# Patient Record
Sex: MALE | Race: BLACK OR AFRICAN AMERICAN | Employment: OTHER
[De-identification: names, ages, dates, MRNs, and addresses within clinical notes are randomized per-mention and may not be internally consistent; named-entity substitution may affect disease eponyms.]

---

## 2022-03-18 PROBLEM — I10 ESSENTIAL HYPERTENSION: Status: ACTIVE | Noted: 2018-10-19

## 2022-03-18 PROBLEM — T81.49XA ABDOMINAL WALL ABSCESS AT SITE OF SURGICAL WOUND: Status: ACTIVE | Noted: 2020-04-23

## 2022-03-18 PROBLEM — A49.8 ENTEROCOCCUS FAECALIS INFECTION: Status: ACTIVE | Noted: 2020-04-23

## 2022-03-18 PROBLEM — R60.9 EDEMA: Status: ACTIVE | Noted: 2018-07-07

## 2022-03-18 PROBLEM — T81.49XA SURGICAL SITE INFECTION: Status: ACTIVE | Noted: 2020-04-23

## 2022-03-18 PROBLEM — D84.821 IMMUNOCOMPROMISED STATE DUE TO DRUG THERAPY (HCC): Status: ACTIVE | Noted: 2020-04-23

## 2022-03-18 PROBLEM — B95.2 ENTEROCOCCUS FAECALIS INFECTION: Status: ACTIVE | Noted: 2020-04-23

## 2022-03-18 PROBLEM — N18.6 END STAGE RENAL DISEASE ON DIALYSIS (HCC): Status: ACTIVE | Noted: 2018-02-07

## 2022-03-18 PROBLEM — B19.20 VIRAL HEPATITIS C: Status: ACTIVE | Noted: 2018-10-05

## 2022-03-18 PROBLEM — N28.89 RENAL MASS, RIGHT: Status: ACTIVE | Noted: 2019-11-10

## 2022-03-18 PROBLEM — Z79.899 IMMUNOCOMPROMISED STATE DUE TO DRUG THERAPY (HCC): Status: ACTIVE | Noted: 2020-04-23

## 2022-03-18 PROBLEM — D84.9 IMMUNOCOMPROMISED PATIENT (HCC): Status: ACTIVE | Noted: 2020-04-23

## 2022-03-18 PROBLEM — Z94.0 DECEASED-DONOR KIDNEY TRANSPLANT: Status: ACTIVE | Noted: 2018-10-05

## 2022-03-18 PROBLEM — Z99.2 END STAGE RENAL DISEASE ON DIALYSIS (HCC): Status: ACTIVE | Noted: 2018-02-07

## 2022-03-19 PROBLEM — E83.42 HYPOMAGNESEMIA: Status: ACTIVE | Noted: 2018-07-07

## 2022-03-19 PROBLEM — N18.5 CKD (CHRONIC KIDNEY DISEASE) STAGE 5, GFR LESS THAN 15 ML/MIN (HCC): Status: ACTIVE | Noted: 2018-07-07

## 2022-03-19 PROBLEM — N52.9 ED (ERECTILE DYSFUNCTION): Status: ACTIVE | Noted: 2017-08-24

## 2022-03-19 PROBLEM — C64.2 RENAL CANCER, LEFT (HCC): Status: ACTIVE | Noted: 2018-09-28

## 2022-03-19 PROBLEM — A49.8 KLEBSIELLA PNEUMONIAE INFECTION: Status: ACTIVE | Noted: 2020-04-23

## 2022-03-19 PROBLEM — N25.81 SECONDARY HYPERPARATHYROIDISM OF RENAL ORIGIN (HCC): Status: ACTIVE | Noted: 2018-07-07

## 2022-03-19 PROBLEM — E55.9 VITAMIN D DEFICIENCY: Status: ACTIVE | Noted: 2018-07-07

## 2022-03-19 PROBLEM — K51.00 PANCOLITIS (HCC): Status: ACTIVE | Noted: 2018-10-05

## 2022-03-19 PROBLEM — I25.10 ATHEROSCLEROSIS OF CORONARY ARTERY: Status: ACTIVE | Noted: 2019-02-26

## 2022-03-19 PROBLEM — E78.00 HYPERCHOLESTEREMIA: Status: ACTIVE | Noted: 2018-07-07

## 2022-03-19 PROBLEM — I10 HYPERTENSION: Status: ACTIVE | Noted: 2018-07-07

## 2022-03-20 PROBLEM — E11.9 DIABETES (HCC): Status: ACTIVE | Noted: 2018-07-07

## 2022-03-20 PROBLEM — E87.20 METABOLIC ACIDOSIS: Status: ACTIVE | Noted: 2018-07-07

## 2022-03-20 PROBLEM — L08.9 SOFT TISSUE INFECTION: Status: ACTIVE | Noted: 2020-04-23

## 2022-03-20 PROBLEM — R19.7 DIARRHEA: Status: ACTIVE | Noted: 2018-10-06

## 2022-11-03 PROBLEM — C64.1 CLEAR CELL RENAL CELL CARCINOMA, RIGHT (HCC): Status: ACTIVE | Noted: 2021-10-12

## 2023-03-06 ENCOUNTER — HOSPITAL ENCOUNTER (OUTPATIENT)
Facility: HOSPITAL | Age: 68
Discharge: HOME | End: 2023-03-09
Payer: MEDICARE

## 2023-03-06 DIAGNOSIS — C64.1 MALIGNANT NEOPLASM OF RIGHT KIDNEY, EXCEPT RENAL PELVIS (HCC): ICD-10-CM

## 2023-03-06 DIAGNOSIS — C64.2 MALIGNANT NEOPLASM OF LEFT KIDNEY, EXCEPT RENAL PELVIS (HCC): ICD-10-CM

## 2023-03-06 PROCEDURE — 74176 CT ABD & PELVIS W/O CONTRAST: CPT

## 2023-03-07 ENCOUNTER — NURSE ONLY (OUTPATIENT)
Age: 68
End: 2023-03-07

## 2023-03-07 VITALS
HEIGHT: 70 IN | WEIGHT: 243 LBS | BODY MASS INDEX: 34.79 KG/M2 | TEMPERATURE: 97.9 F | OXYGEN SATURATION: 98 % | RESPIRATION RATE: 18 BRPM | HEART RATE: 71 BPM

## 2023-03-07 DIAGNOSIS — B19.20 HEPATITIS C VIRUS INFECTION WITHOUT HEPATIC COMA, UNSPECIFIED CHRONICITY: ICD-10-CM

## 2023-03-07 DIAGNOSIS — E11.22 TYPE 2 DIABETES MELLITUS WITH DIABETIC CHRONIC KIDNEY DISEASE, UNSPECIFIED CKD STAGE, UNSPECIFIED WHETHER LONG TERM INSULIN USE (HCC): ICD-10-CM

## 2023-03-07 DIAGNOSIS — Z94.0 KIDNEY TRANSPLANT STATUS: ICD-10-CM

## 2023-03-07 DIAGNOSIS — Z01.818 PRE-OP TESTING: Primary | ICD-10-CM

## 2023-03-07 DIAGNOSIS — E11.9 DIABETES MELLITUS TYPE 2 WITHOUT RETINOPATHY (HCC): ICD-10-CM

## 2023-03-07 NOTE — PROGRESS NOTES
Colon Screen    Patient: Marlen Vang MRN: [id number]  SSN: [id number]    YOB: 1955  Age: 75 y.o. Sex: male        Subjective:   Marlen Vang was referred by his PCP, Maximo Russell MD.  Patient referred for colonoscopy for   Screening colonoscopy. Patient denies rectal pain or bleeding. Abdominal surgeries as described below, specifically left nephrectomy and kidney transplant. Family history as described below, specifically none. Last colonoscopy was 5 years ago. No Known Allergies    Past Medical History:   Diagnosis Date    Anuria     Carcinoma, renal cell, left ([facility] [address] 2018    CKD (chronic kidney disease)     Diabetes mellitus ([facility] [address]     Edema     Hypercholesteremia     Hypertension      Past Surgical History:   Procedure Laterality Date    COLONOSCOPY  2018    KIDNEY TRANSPLANT  2019     donor renal transplant - left kidney to the right iliac fossaUreteroneocystostomy over 4.7F 14cm double J ureteral stent    NEPHRECTOMY Left 2018    Dr. Viri Dallas    OTHER SURGICAL HISTORY      Teeth      Family History   Problem Relation Age of Onset    Diabetes Brother     Emphysema Father     High Cholesterol Mother     Heart Attack Mother      Social History     Tobacco Use    Smoking status: Former    Smokeless tobacco: Never   Substance Use Topics    Alcohol use: No      Prior to Admission medications    Medication Sig Start Date End Date Taking?  Authorizing Provider   Lancets MISC One Touch Delica Lancets, E 51.5 Type 2 Diabetes, Yes Z279.4 two times daily 19  Yes Ar Automatic Reconciliation   allopurinol (ZYLOPRIM) 100 MG tablet Take 100 mg by mouth 19  Yes Ar Automatic Reconciliation   aspirin 81 MG chewable tablet Take 81 mg by mouth 9/3/19  Yes Ar Automatic Reconciliation   carvedilol (COREG) 25 MG tablet Take 25 mg by mouth 19  Yes Ar Automatic Reconciliation   chlorthalidone (HYGROTON) 25 MG tablet Take by mouth daily Yes Ar Automatic Reconciliation   cloNIDine (CATAPRES) 0.1 MG tablet Take by mouth 2 times daily   Yes Ar Automatic Reconciliation   empagliflozin (JARDIANCE) 25 MG tablet Take 25 mg by mouth daily 10/21/22  Yes Ar Automatic Reconciliation   famotidine (PEPCID) 20 MG tablet Take 20 mg by mouth 12/13/19  Yes Ar Automatic Reconciliation   furosemide (LASIX) 40 MG tablet ceived the following from [place] [address] [facility]: Outside name: furosemide (LASIX) 40 mg tablet 9/26/22  Yes Ar Automatic Reconciliation   hydrALAZINE (APRESOLINE) 100 MG tablet ceived the following from [facility]: Outside name: hydrALAZINE (APRESOLINE) 100 mg tablet 9/22/22  Yes Ar Automatic Reconciliation   insulin lispro (HUMALOG) 100 UNIT/ML SOLN injection vial INJECT 8 UNITS BENEATH THE SKIN 3 (THREE) TIMES DAILY BEFORE MEALS. E11.9 11/22/20  Yes Ar Automatic Reconciliation   insulin NPH (HUMULIN N;NOVOLIN N) 100 UNIT/ML injection vial 10-20 Units   Yes Ar Automatic Reconciliation   Mycophenolate Sodium 360 MG TBEC TAKE 1 TAB BY MOUTH TWICE DAILY.  Z94.0 2/5/21  Yes Ar Automatic Reconciliation   NIFEdipine (PROCARDIA XL) 60 MG extended release tablet Take 60 mg by mouth 2 times daily 10/21/22  Yes Ar Automatic Reconciliation   nitroGLYCERIN (NITROSTAT) 0.4 MG SL tablet Place 0.4 mg under the tongue 2/27/19  Yes Ar Automatic Reconciliation   pravastatin (PRAVACHOL) 20 MG tablet ceived the following from [facility].  - [facility]: Outside name: pravastatin (PRAVACHOL) 20 mg tablet 10/5/22  Yes Ar Automatic Reconciliation   predniSONE (DELTASONE) 20 MG tablet ceived the following from [place] [address] - [facility]: Outside name: predniSONE (DELTASONE) 20 mg tablet 11/7/19  Yes Ar Automatic Reconciliation   sodium bicarbonate 650 MG tablet Take 1,300 mg by mouth 12/19/19  Yes Ar Automatic Reconciliation   tacrolimus (PROGRAF) 1 MG capsule Take 1-2 mg by mouth 12/18/19  Yes Ar Automatic Reconciliation   tamsulosin (FLOMAX) 0.4 MG capsule 0.4 mg 7/24/17  Yes Ar Automatic Reconciliation          Risks colonoscopy described- colon injury, missed lesion, anesthesia problems, bleeding       Chris Álvarez LPN  March 7, 7359  34:83 PM

## 2023-04-19 PROBLEM — B19.20 VIRAL HEPATITIS C: Status: ACTIVE | Noted: 2018-10-05

## 2023-04-19 PROBLEM — E66.01 SEVERE OBESITY WITH BODY MASS INDEX (BMI) OF 35.0 TO 39.9 WITH SERIOUS COMORBIDITY (HCC): Status: ACTIVE | Noted: 2018-07-07

## 2023-04-19 PROBLEM — Z99.2 END STAGE RENAL DISEASE ON DIALYSIS (HCC): Status: ACTIVE | Noted: 2018-02-07

## 2023-04-19 PROBLEM — N28.89 LEFT RENAL MASS: Status: ACTIVE | Noted: 2018-08-16

## 2023-04-19 PROBLEM — I25.10 CORONARY ATHEROSCLEROSIS: Status: ACTIVE | Noted: 2019-02-26

## 2023-04-19 PROBLEM — N18.6 END STAGE RENAL DISEASE ON DIALYSIS (HCC): Status: ACTIVE | Noted: 2018-02-07

## 2023-04-19 PROBLEM — Z90.5 H/O UNILATERAL NEPHRECTOMY: Status: ACTIVE | Noted: 2018-10-05

## 2023-04-19 PROBLEM — E11.9 DIABETES (HCC): Status: ACTIVE | Noted: 2018-07-07

## 2023-07-19 PROBLEM — C64.9 RENAL CELL CARCINOMA (HCC): Status: ACTIVE | Noted: 2018-09-28

## 2024-01-11 ENCOUNTER — OFFICE VISIT (OUTPATIENT)
Age: 69
End: 2024-01-11
Payer: MEDICARE

## 2024-01-11 VITALS — WEIGHT: 240.4 LBS | HEIGHT: 70 IN | BODY MASS INDEX: 34.41 KG/M2

## 2024-01-11 DIAGNOSIS — M48.062 SPINAL STENOSIS OF LUMBAR REGION WITH NEUROGENIC CLAUDICATION: Primary | ICD-10-CM

## 2024-01-11 DIAGNOSIS — M54.42 CHRONIC BILATERAL LOW BACK PAIN WITH BILATERAL SCIATICA: ICD-10-CM

## 2024-01-11 DIAGNOSIS — M54.41 CHRONIC BILATERAL LOW BACK PAIN WITH BILATERAL SCIATICA: ICD-10-CM

## 2024-01-11 DIAGNOSIS — G89.29 CHRONIC BILATERAL LOW BACK PAIN WITH BILATERAL SCIATICA: ICD-10-CM

## 2024-01-11 PROCEDURE — 99203 OFFICE O/P NEW LOW 30 MIN: CPT | Performed by: PHYSICAL MEDICINE & REHABILITATION

## 2024-01-11 PROCEDURE — 1123F ACP DISCUSS/DSCN MKR DOCD: CPT | Performed by: PHYSICAL MEDICINE & REHABILITATION

## 2024-01-11 ASSESSMENT — PATIENT HEALTH QUESTIONNAIRE - PHQ9
SUM OF ALL RESPONSES TO PHQ QUESTIONS 1-9: 0
SUM OF ALL RESPONSES TO PHQ QUESTIONS 1-9: 0
SUM OF ALL RESPONSES TO PHQ9 QUESTIONS 1 & 2: 0
SUM OF ALL RESPONSES TO PHQ QUESTIONS 1-9: 0
1. LITTLE INTEREST OR PLEASURE IN DOING THINGS: 0
SUM OF ALL RESPONSES TO PHQ QUESTIONS 1-9: 0
2. FEELING DOWN, DEPRESSED OR HOPELESS: 0

## 2024-01-11 NOTE — PROGRESS NOTES
[facility]  [facility] [facility] [facility], [address]  [place] [address]  Phone: [phone]  Fax: [phone]      Patient: Ruel Parekh                                                                              MRN: [id number]        YOB: 1955          AGE: 68 y.o.             PCP: Sandra Gilmore MD  Date:  01/11/24    Reason for Consultation: Back Pain      HPI:  Ruel Parekh is a 68 y.o. male with relevant PMH of  CKD has been on dialysis- right kidney transplant 2018, and then had left renal cancer s/p nephrectomy, DM,   who presents with low back pain radiating down bilateral legs which began around 10/2023 when he noticed numbness tingling in bilateral legs. Denies any precipitating incident or trauma.       Neurologic symptoms: + numbness, tingling, weakness, bowel or bladder changes.  No recent falls  but had a close call 10/2023    Location: The pain is located in the low back pain   Radiation: The pain does radiate down bilateral legs .    Pain Score: Currently: 3/10   Quality: Pain is of a aching, numbness, tinglingquality.    Aggravating: Pain is exacerbated by walking and standing  Alleviating: The pain is alleviated by sitting    Prior Treatments:  Physical therapy: No  Injections:No  Surgery:No  Previous Medications:   Current Medications: tylenol   Previous work-up has included:   X-ray lumbar spine 11/9/2023  There is a very mild curvature of the lumbar spine with convexity to the right centered at the L3 level. There is mild disc space narrowing at the L4-L5 and L5-S1 levels. Extensive discal spurring is seen at the L5-S1 level. Severe lower lumbar facet arthropathy is present at the L3-L4, L4-L5 and L5-S1 levels. Minimal 2 to 3 mm anterolisthesis is present at the L2-L3 level. The pedicles are normal.   Past Medical History:   Past Medical History:   Diagnosis Date    Anuria     Carcinoma, renal cell, left (HCC) 09/21/2018    CKD

## 2024-01-11 NOTE — PROGRESS NOTES
Ruel Parekh presents today for   Chief Complaint   Patient presents with    Back Pain       Is someone accompanying this pt? no    Is the patient using any DME equipment during OV? no    Depression Screening:       No data to display                Learning Assessment:      Abuse Screening:       No data to display                Fall Risk      OPIOID RISK TOOL      Coordination of Care:  1. Have you been to the ER, urgent care clinic since your last visit? no  Hospitalized since your last visit? no    2. Have you seen or consulted any other health care providers outside of the [facility] since your last visit? no Include any pap smears or colon screening. no

## 2024-03-29 DIAGNOSIS — N18.5 CKD (CHRONIC KIDNEY DISEASE) STAGE 5, GFR LESS THAN 15 ML/MIN (HCC): ICD-10-CM

## 2024-03-29 DIAGNOSIS — E11.22 TYPE 2 DIABETES MELLITUS WITH DIABETIC CHRONIC KIDNEY DISEASE, UNSPECIFIED CKD STAGE, UNSPECIFIED WHETHER LONG TERM INSULIN USE (HCC): ICD-10-CM

## 2024-03-29 DIAGNOSIS — Z99.2 END STAGE RENAL DISEASE ON DIALYSIS (HCC): ICD-10-CM

## 2024-03-29 DIAGNOSIS — Z01.818 PRE-OP TESTING: Primary | ICD-10-CM

## 2024-03-29 DIAGNOSIS — N18.6 END STAGE RENAL DISEASE ON DIALYSIS (HCC): ICD-10-CM

## 2024-03-29 DIAGNOSIS — I10 ESSENTIAL HYPERTENSION: ICD-10-CM

## 2024-04-17 ENCOUNTER — TELEPHONE (OUTPATIENT)
Age: 69
End: 2024-04-17

## 2024-04-17 NOTE — TELEPHONE ENCOUNTER
LMOM for pt to return call. Pt needs to complete labs and EKG. Pt is scheduled for colonoscopy on 04/26/24 with an arrival time of 12:45 PM. LM that a return call is needed by this Friday, 04/19/24 to confirm.

## 2024-04-23 NOTE — TELEPHONE ENCOUNTER
LMOM for pt to call back to reschedule the colonoscopy since the blood work and EKG have not been completed.     Colonoscopy is postponed at this time.

## 2025-01-10 ENCOUNTER — OFFICE VISIT (OUTPATIENT)
Age: 70
End: 2025-01-10
Payer: MEDICARE

## 2025-01-10 VITALS
BODY MASS INDEX: 33.36 KG/M2 | WEIGHT: 233 LBS | OXYGEN SATURATION: 96 % | HEART RATE: 76 BPM | HEIGHT: 70 IN | SYSTOLIC BLOOD PRESSURE: 124 MMHG | DIASTOLIC BLOOD PRESSURE: 70 MMHG

## 2025-01-10 DIAGNOSIS — I25.10 CORONARY ARTERY DISEASE INVOLVING NATIVE CORONARY ARTERY OF NATIVE HEART WITHOUT ANGINA PECTORIS: Primary | ICD-10-CM

## 2025-01-10 PROCEDURE — 3078F DIAST BP <80 MM HG: CPT | Performed by: INTERNAL MEDICINE

## 2025-01-10 PROCEDURE — 99204 OFFICE O/P NEW MOD 45 MIN: CPT | Performed by: INTERNAL MEDICINE

## 2025-01-10 PROCEDURE — 3074F SYST BP LT 130 MM HG: CPT | Performed by: INTERNAL MEDICINE

## 2025-01-10 PROCEDURE — 1123F ACP DISCUSS/DSCN MKR DOCD: CPT | Performed by: INTERNAL MEDICINE

## 2025-01-10 ASSESSMENT — PATIENT HEALTH QUESTIONNAIRE - PHQ9
SUM OF ALL RESPONSES TO PHQ9 QUESTIONS 1 & 2: 0
1. LITTLE INTEREST OR PLEASURE IN DOING THINGS: NOT AT ALL
SUM OF ALL RESPONSES TO PHQ QUESTIONS 1-9: 0
2. FEELING DOWN, DEPRESSED OR HOPELESS: NOT AT ALL
SUM OF ALL RESPONSES TO PHQ QUESTIONS 1-9: 0

## 2025-01-10 NOTE — PROGRESS NOTES
Identified pt with two pt identifiers(name and ). Reviewed record in preparation for visit and have obtained necessary documentation.    Ruel Parekh presents today for   Chief Complaint   Patient presents with    New Patient      Est Care         Pt c/o SOB, FATIGUE/WEAKNESS,             Ruel Parekh preferred language for health care discussion is english/other.    Personal Protective Equipment:   Personal Protective Equipment was used including: mask-surgical and hands-gloves. Patient was placed on no precaution(s). Patient was not masked.    Precautions:   Patient currently on None  Patient currently roomed with door closed.    Is someone accompanying this pt? no    Is the patient using any DME equipment during OV? cane    Depression Screenin/10/2025     9:20 AM 2024     2:13 PM   PHQ-9 Questionaire   Little interest or pleasure in doing things 0 0   Feeling down, depressed, or hopeless 0 0   PHQ-9 Total Score 0 0        Learning Assessment:  Who is the primary learner? Patient    What is the preferred language for health care of the primary learner? ENGLISH    How does the primary learner prefer to learn new concepts? DEMONSTRATION    Answered By patient    Relationship to Learner SELF        Abuse Screenin/10/2025     9:00 AM   AMB Abuse Screening   Do you ever feel afraid of your partner? N   Are you in a relationship with someone who physically or mentally threatens you? N   Is it safe for you to go home? Y          Fall Risk      1/10/2025     9:18 AM 2024     2:13 PM   Fall Risk   Do you feel unsteady or are you worried about falling?  no yes   2 or more falls in past year? no no   Fall with injury in past year? no no         Pt currently taking Anticoagulant /Antiplatelet therapy? aspirin    Coordination of Care:  1. Have you been to the ER, urgent care clinic since your last visit? Hospitalized since your last visit? no    2. Have you seen or consulted any other

## 2025-01-10 NOTE — PATIENT INSTRUCTIONS
Testing   Nuclear Stress    Nuclear Stress Instructions-  PATIENT PREPS:   -NPO (Nothing to eat or drink) after midnight the night prior to the exam.    -No medications on the day of exam. You may bring them with you.   -Wear comfortable clothing and shoes suitable for walking on a treadmill. (NO sandals, flip flops, high heels, etc)   -The duration of this exam is approximately 2 to 4 hours.

## 2025-01-21 NOTE — PROGRESS NOTES
[facility] -  Office Consult Note      Primary Care Physician:  Lila Decker ACNP         Assessment:     Shortness of breath/PARTIDA (change in exercise tolerance)  CAD status post coronary stenting 02/2019 mid LAD using a 2.5 x 28 mm Synergy CHRISTOPHER and successful PCI of the distal left circumflex using a 3.0 x 16 mm Synergy CHRISTOPHER.  Nuclear stress test completed 7/13/2021, no significant evidence of ischemia or infarct.  ESRD, status post DDKT 11/6/2019.  Hypercholesterolemia  Diabetes, type II  Remote tobacco  Hypertension, essential  History of unilateral nephrectomy (left, 2/2 renal cell cancer)  ED      Patient Active Problem List   Diagnosis    Surgical site infection    Viral hepatitis C    Essential hypertension    Enterococcus faecalis infection    Abdominal wall abscess at site of surgical wound    H/O unilateral nephrectomy    End stage renal disease on dialysis (HCC)    Immunocompromised patient (HCC)    Immunocompromised state due to drug therapy (HCC)    Left renal mass    Edema    Hypomagnesemia    Hypercholesteremia    Hypertension    CKD (chronic kidney disease) stage 5, GFR less than 15 ml/min (HCC)    Klebsiella pneumoniae infection    Therapeutic drug monitoring    Pancolitis (HCC)    Secondary hyperparathyroidism of renal origin (HCC)    Renal cell carcinoma (HCC)    Coronary atherosclerosis    ED (erectile dysfunction)    Vitamin D deficiency    Diarrhea    Diabetes (HCC)    Metabolic acidosis    Soft tissue infection    Clear cell renal cell carcinoma, right (HCC)    Severe obesity with body mass index (BMI) of 35.0 to 39.9 with serious comorbidity    Type II or unspecified type diabetes mellitus with peripheral circulatory disorders, not stated as uncontrolled(250.70)        Plan:     Nuclear stress test.  Return after test completed.     History of Present Illness:     This is a 69 y.o. man with the above mentioned previous medical/surgical illnesses that is requesting cardiology

## 2025-04-07 ENCOUNTER — OFFICE VISIT (OUTPATIENT)
Age: 70
End: 2025-04-07
Payer: MEDICARE

## 2025-04-07 VITALS
SYSTOLIC BLOOD PRESSURE: 113 MMHG | BODY MASS INDEX: 32.64 KG/M2 | WEIGHT: 228 LBS | DIASTOLIC BLOOD PRESSURE: 64 MMHG | HEART RATE: 72 BPM | OXYGEN SATURATION: 97 % | HEIGHT: 70 IN

## 2025-04-07 DIAGNOSIS — I25.10 CORONARY ARTERY DISEASE INVOLVING NATIVE CORONARY ARTERY OF NATIVE HEART WITHOUT ANGINA PECTORIS: Primary | ICD-10-CM

## 2025-04-07 PROCEDURE — 1123F ACP DISCUSS/DSCN MKR DOCD: CPT | Performed by: INTERNAL MEDICINE

## 2025-04-07 PROCEDURE — 3074F SYST BP LT 130 MM HG: CPT | Performed by: INTERNAL MEDICINE

## 2025-04-07 PROCEDURE — 3078F DIAST BP <80 MM HG: CPT | Performed by: INTERNAL MEDICINE

## 2025-04-07 PROCEDURE — 99214 OFFICE O/P EST MOD 30 MIN: CPT | Performed by: INTERNAL MEDICINE

## 2025-04-07 RX ORDER — ERGOCALCIFEROL 1.25 MG/1
CAPSULE, LIQUID FILLED ORAL
COMMUNITY
Start: 2025-01-20

## 2025-04-07 RX ORDER — BLOOD SUGAR DIAGNOSTIC
STRIP MISCELLANEOUS
COMMUNITY
Start: 2025-02-07

## 2025-04-07 RX ORDER — CLONIDINE 0.3 MG/24H
PATCH, EXTENDED RELEASE TRANSDERMAL
COMMUNITY
Start: 2025-04-04

## 2025-04-07 ASSESSMENT — PATIENT HEALTH QUESTIONNAIRE - PHQ9
SUM OF ALL RESPONSES TO PHQ QUESTIONS 1-9: 0
1. LITTLE INTEREST OR PLEASURE IN DOING THINGS: NOT AT ALL
SUM OF ALL RESPONSES TO PHQ QUESTIONS 1-9: 0
2. FEELING DOWN, DEPRESSED OR HOPELESS: NOT AT ALL

## 2025-04-07 NOTE — PATIENT INSTRUCTIONS
New Medication/Medication Changes/Medication Refill  Coreg 12.5 mg tab twice a day     **please allow 24-48 hrs for medication to be escribed to pharmacy** If you need any refills on medications please contact your pharmacy so that the request can be escribed to the provider for review seven to 10 days prior to being out of medication.

## 2025-04-07 NOTE — PROGRESS NOTES
Identified pt with two pt identifiers(name and ). Reviewed record in preparation for visit and have obtained necessary documentation.    Ruel RAD Parekh presents today for   Chief Complaint   Patient presents with    Follow-up     f/u post NMST         Pt c/o SOB, CHEST PAIN/ PRESSURE, FATIGUE/WEAKNESS,             Ruel Parekh preferred language for health care discussion is english/other.    Personal Protective Equipment:   Personal Protective Equipment was used including: mask-surgical and hands-gloves. Patient was placed on no precaution(s). Patient was not masked.    Precautions:   Patient currently on None  Patient currently roomed with door closed.    Is someone accompanying this pt? no    Is the patient using any DME equipment during OV? cane    Depression Screenin/7/2025    11:22 AM 1/10/2025     9:20 AM 2024     2:13 PM   PHQ-9 Questionaire   Little interest or pleasure in doing things 0 0 0   Feeling down, depressed, or hopeless 0 0 0   PHQ-9 Total Score 0 0 0        Learning Assessment:  Completed    Abuse Screenin/7/2025    11:00 AM 1/10/2025     9:00 AM   AMB Abuse Screening   Do you ever feel afraid of your partner? N N   Are you in a relationship with someone who physically or mentally threatens you? N N   Is it safe for you to go home? Y Y          Fall Risk      2025    11:22 AM 1/10/2025     9:18 AM 2024     2:13 PM   Fall Risk   Do you feel unsteady or are you worried about falling?  no no yes   2 or more falls in past year? no no no   Fall with injury in past year? no no no         Pt currently taking Anticoagulant /Antiplatelet therapy? aspirin    Coordination of Care:  1. Have you been to the ER, urgent care clinic since your last visit? Hospitalized since your last visit? no    2. Have you seen or consulted any other health care providers outside of the [facility] since your last visit? Include any pap smears or colon screening.

## 2025-04-19 NOTE — PROGRESS NOTES
[facility] -  Office Follow-up note      Primary Care Physician:  Lila Decker ACNP         Assessment:     Shortness of breath/PARTIDA (change in exercise tolerance)  CAD status post coronary stenting 02/2019 mid LAD using a 2.5 x 28 mm Synergy CHRISTOPHER and successful PCI of the distal left circumflex using a 3.0 x 16 mm Synergy CHRISTOPHER.  Nuclear stress test completed 7/13/2021, no significant evidence of ischemia or infarct.  ESRD, status post DDKT 11/6/2019.  Hypercholesterolemia  Diabetes, type II  Remote tobacco  Hypertension, essential  History of unilateral nephrectomy (left, 2/2 renal cell cancer)  ED      Patient Active Problem List   Diagnosis    Surgical site infection    Viral hepatitis C    Essential hypertension    Enterococcus faecalis infection    Abdominal wall abscess at site of surgical wound    H/O unilateral nephrectomy    End stage renal disease on dialysis (HCC)    Immunocompromised patient    Immunocompromised state due to drug therapy    Left renal mass    Edema    Hypomagnesemia    Hypercholesteremia    Hypertension    CKD (chronic kidney disease) stage 5, GFR less than 15 ml/min (HCC)    Klebsiella pneumoniae infection    Therapeutic drug monitoring    Pancolitis (HCC)    Secondary hyperparathyroidism of renal origin    Renal cell carcinoma    Coronary atherosclerosis    ED (erectile dysfunction)    Vitamin D deficiency    Diarrhea    Diabetes (HCC)    Metabolic acidosis    Soft tissue infection    Clear cell renal cell carcinoma, right    Severe obesity with body mass index (BMI) of 35.0 to 39.9 with serious comorbidity    Type II or unspecified type diabetes mellitus with peripheral circulatory disorders, not stated as uncontrolled(250.70)        Plan:     Nuclear stress test ordered and completed on 2/17/2025.  The test was normal suggesting a low risk of cardiac events.  Ejection fraction was 61%.  Decrease carvedilol to 12.5 mg twice daily.  Return in 6 months     History of